# Patient Record
Sex: MALE | Race: WHITE | NOT HISPANIC OR LATINO | Employment: FULL TIME | URBAN - METROPOLITAN AREA
[De-identification: names, ages, dates, MRNs, and addresses within clinical notes are randomized per-mention and may not be internally consistent; named-entity substitution may affect disease eponyms.]

---

## 2017-04-02 ENCOUNTER — OFFICE VISIT (OUTPATIENT)
Dept: URGENT CARE | Facility: CLINIC | Age: 47
End: 2017-04-02
Payer: COMMERCIAL

## 2017-04-02 VITALS
HEART RATE: 110 BPM | TEMPERATURE: 97.3 F | OXYGEN SATURATION: 96 % | HEIGHT: 71 IN | BODY MASS INDEX: 43.4 KG/M2 | WEIGHT: 310 LBS | RESPIRATION RATE: 18 BRPM | SYSTOLIC BLOOD PRESSURE: 120 MMHG | DIASTOLIC BLOOD PRESSURE: 80 MMHG

## 2017-04-02 DIAGNOSIS — J40 BRONCHITIS: ICD-10-CM

## 2017-04-02 DIAGNOSIS — J32.9 OTHER SINUSITIS: ICD-10-CM

## 2017-04-02 PROCEDURE — 99203 OFFICE O/P NEW LOW 30 MIN: CPT | Performed by: PHYSICIAN ASSISTANT

## 2017-04-02 RX ORDER — PREDNISONE 20 MG/1
40 TABLET ORAL DAILY
Qty: 8 TAB | Refills: 0 | Status: SHIPPED | OUTPATIENT
Start: 2017-04-02 | End: 2017-04-06

## 2017-04-02 RX ORDER — PROMETHAZINE HYDROCHLORIDE AND CODEINE PHOSPHATE 6.25; 1 MG/5ML; MG/5ML
5-10 SYRUP ORAL 3 TIMES DAILY PRN
Qty: 150 ML | Refills: 0 | Status: SHIPPED | OUTPATIENT
Start: 2017-04-02 | End: 2018-02-21

## 2017-04-02 RX ORDER — CEFUROXIME AXETIL 500 MG/1
500 TABLET ORAL 2 TIMES DAILY
Qty: 20 TAB | Refills: 0 | Status: SHIPPED | OUTPATIENT
Start: 2017-04-02 | End: 2017-04-12

## 2017-04-02 RX ORDER — ALBUTEROL SULFATE 90 UG/1
2 AEROSOL, METERED RESPIRATORY (INHALATION) EVERY 4 HOURS PRN
Qty: 1 INHALER | Refills: 0 | Status: SHIPPED | OUTPATIENT
Start: 2017-04-02 | End: 2018-02-21

## 2017-04-02 ASSESSMENT — ENCOUNTER SYMPTOMS
HEADACHES: 1
SINUS PRESSURE: 1
SPUTUM PRODUCTION: 1
CONSTITUTIONAL NEGATIVE: 1
COUGH: 1
SHORTNESS OF BREATH: 0
SORE THROAT: 0
RHINORRHEA: 1
CARDIOVASCULAR NEGATIVE: 1
EYES NEGATIVE: 1

## 2017-04-02 NOTE — Clinical Note
April 2, 2017         Patient: Sal Abraham   YOB: 1970   Date of Visit: 4/2/2017           To Whom it May Concern:    Sal Abraham was seen in my clinic on 4/2/2017 for illness; please excuse Monday.    If you have any questions or concerns, please don't hesitate to call.        Sincerely,           Aldo Barksdale PA-C  Electronically Signed

## 2017-04-03 NOTE — PROGRESS NOTES
"Subjective:      Sal Abraham is a 46 y.o. male who presents with Nasal Congestion; Sinus Problem; and Cough            Sinus Problem  This is a new problem. The current episode started in the past 7 days. The problem is unchanged. There has been no fever. The pain is moderate. Associated symptoms include congestion, coughing, headaches and sinus pressure. Pertinent negatives include no shortness of breath or sore throat. Past treatments include nothing. The treatment provided no relief.   Cough  This is a new problem. The current episode started in the past 7 days. The problem has been unchanged. The problem occurs every few minutes. The cough is productive of sputum. Associated symptoms include headaches, nasal congestion and rhinorrhea. Pertinent negatives include no sore throat or shortness of breath. Nothing aggravates the symptoms. He has tried nothing for the symptoms. The treatment provided no relief. There is no history of asthma or environmental allergies.       Review of Systems   Constitutional: Negative.    HENT: Positive for congestion, rhinorrhea and sinus pressure. Negative for sore throat.    Eyes: Negative.    Respiratory: Positive for cough and sputum production. Negative for shortness of breath.    Cardiovascular: Negative.    Skin: Negative.    Neurological: Positive for headaches.   Endo/Heme/Allergies: Negative for environmental allergies.          Objective:     /80 mmHg  Pulse 110  Temp(Src) 36.3 °C (97.3 °F)  Resp 18  Ht 1.803 m (5' 11\")  Wt 140.615 kg (310 lb)  BMI 43.26 kg/m2  SpO2 96%     Physical Exam   Constitutional: He is oriented to person, place, and time. He appears well-developed and well-nourished. No distress.   HENT:   Head: Normocephalic and atraumatic.   +maxill.sinus press.     Eyes: EOM are normal. Pupils are equal, round, and reactive to light.   Neck: Normal range of motion. Neck supple.   Cardiovascular: Normal rate.    Pulmonary/Chest: Effort " "normal and breath sounds normal. No respiratory distress. He has no wheezes. He has no rales.   Lymphadenopathy:     He has no cervical adenopathy.   Neurological: He is alert and oriented to person, place, and time.   Skin: Skin is warm and dry.   Psychiatric: He has a normal mood and affect. His behavior is normal. Judgment and thought content normal.   Nursing note and vitals reviewed.    Filed Vitals:    04/02/17 1702   BP: 120/80   Pulse: 110   Temp: 36.3 °C (97.3 °F)   Resp: 18   Height: 1.803 m (5' 11\")   Weight: 140.615 kg (310 lb)   SpO2: 96%     Active Ambulatory Problems     Diagnosis Date Noted   • No Active Ambulatory Problems     Resolved Ambulatory Problems     Diagnosis Date Noted   • No Resolved Ambulatory Problems     No Additional Past Medical History     No current outpatient prescriptions on file prior to visit.     No current facility-administered medications on file prior to visit.     Gargles, Cepacol lozenges, Aleve/Advil as needed for throat pain  History reviewed. No pertinent family history.  Review of patient's allergies indicates no known allergies.              Assessment/Plan:     ·  sinusitis, bronchitis      ·       "

## 2018-02-15 ENCOUNTER — APPOINTMENT (OUTPATIENT)
Dept: MEDICAL GROUP | Facility: PHYSICIAN GROUP | Age: 48
End: 2018-02-15
Payer: COMMERCIAL

## 2018-02-21 ENCOUNTER — HOSPITAL ENCOUNTER (OUTPATIENT)
Dept: LAB | Facility: MEDICAL CENTER | Age: 48
End: 2018-02-21
Attending: FAMILY MEDICINE
Payer: COMMERCIAL

## 2018-02-21 ENCOUNTER — OFFICE VISIT (OUTPATIENT)
Dept: MEDICAL GROUP | Facility: PHYSICIAN GROUP | Age: 48
End: 2018-02-21
Payer: COMMERCIAL

## 2018-02-21 VITALS
TEMPERATURE: 97.6 F | HEIGHT: 71 IN | BODY MASS INDEX: 44.1 KG/M2 | HEART RATE: 106 BPM | WEIGHT: 315 LBS | SYSTOLIC BLOOD PRESSURE: 134 MMHG | DIASTOLIC BLOOD PRESSURE: 86 MMHG | OXYGEN SATURATION: 94 %

## 2018-02-21 DIAGNOSIS — I10 ESSENTIAL HYPERTENSION: ICD-10-CM

## 2018-02-21 DIAGNOSIS — Z00.00 ROUTINE HEALTH MAINTENANCE: ICD-10-CM

## 2018-02-21 DIAGNOSIS — G44.219 EPISODIC TENSION-TYPE HEADACHE, NOT INTRACTABLE: ICD-10-CM

## 2018-02-21 DIAGNOSIS — Z12.5 SCREENING FOR PROSTATE CANCER: ICD-10-CM

## 2018-02-21 PROBLEM — R51.9 FREQUENT HEADACHES: Status: ACTIVE | Noted: 2018-02-21

## 2018-02-21 PROBLEM — R09.89 CHRONIC SINUS COMPLAINTS: Status: ACTIVE | Noted: 2018-02-21

## 2018-02-21 LAB
ALBUMIN SERPL BCP-MCNC: 4.4 G/DL (ref 3.2–4.9)
ALBUMIN/GLOB SERPL: 1.2 G/DL
ALP SERPL-CCNC: 59 U/L (ref 30–99)
ALT SERPL-CCNC: 24 U/L (ref 2–50)
ANION GAP SERPL CALC-SCNC: 5 MMOL/L (ref 0–11.9)
AST SERPL-CCNC: 18 U/L (ref 12–45)
BASOPHILS # BLD AUTO: 0.6 % (ref 0–1.8)
BASOPHILS # BLD: 0.06 K/UL (ref 0–0.12)
BILIRUB SERPL-MCNC: 0.2 MG/DL (ref 0.1–1.5)
BUN SERPL-MCNC: 16 MG/DL (ref 8–22)
CALCIUM SERPL-MCNC: 9.8 MG/DL (ref 8.5–10.5)
CHLORIDE SERPL-SCNC: 103 MMOL/L (ref 96–112)
CHOLEST SERPL-MCNC: 225 MG/DL (ref 100–199)
CO2 SERPL-SCNC: 29 MMOL/L (ref 20–33)
CREAT SERPL-MCNC: 0.99 MG/DL (ref 0.5–1.4)
EOSINOPHIL # BLD AUTO: 0.11 K/UL (ref 0–0.51)
EOSINOPHIL NFR BLD: 1.2 % (ref 0–6.9)
ERYTHROCYTE [DISTWIDTH] IN BLOOD BY AUTOMATED COUNT: 39.5 FL (ref 35.9–50)
GLOBULIN SER CALC-MCNC: 3.7 G/DL (ref 1.9–3.5)
GLUCOSE SERPL-MCNC: 102 MG/DL (ref 65–99)
HCT VFR BLD AUTO: 51.3 % (ref 42–52)
HDLC SERPL-MCNC: 36 MG/DL
HGB BLD-MCNC: 17.4 G/DL (ref 14–18)
IMM GRANULOCYTES # BLD AUTO: 0.05 K/UL (ref 0–0.11)
IMM GRANULOCYTES NFR BLD AUTO: 0.5 % (ref 0–0.9)
LDLC SERPL CALC-MCNC: 146 MG/DL
LYMPHOCYTES # BLD AUTO: 1.67 K/UL (ref 1–4.8)
LYMPHOCYTES NFR BLD: 18 % (ref 22–41)
MCH RBC QN AUTO: 30 PG (ref 27–33)
MCHC RBC AUTO-ENTMCNC: 33.9 G/DL (ref 33.7–35.3)
MCV RBC AUTO: 88.4 FL (ref 81.4–97.8)
MONOCYTES # BLD AUTO: 1.16 K/UL (ref 0–0.85)
MONOCYTES NFR BLD AUTO: 12.5 % (ref 0–13.4)
NEUTROPHILS # BLD AUTO: 6.22 K/UL (ref 1.82–7.42)
NEUTROPHILS NFR BLD: 67.2 % (ref 44–72)
NRBC # BLD AUTO: 0 K/UL
NRBC BLD-RTO: 0 /100 WBC
PLATELET # BLD AUTO: 311 K/UL (ref 164–446)
PMV BLD AUTO: 10.9 FL (ref 9–12.9)
POTASSIUM SERPL-SCNC: 4.3 MMOL/L (ref 3.6–5.5)
PROT SERPL-MCNC: 8.1 G/DL (ref 6–8.2)
PSA SERPL-MCNC: 0.53 NG/ML (ref 0–4)
RBC # BLD AUTO: 5.8 M/UL (ref 4.7–6.1)
SODIUM SERPL-SCNC: 137 MMOL/L (ref 135–145)
TRIGL SERPL-MCNC: 214 MG/DL (ref 0–149)
TSH SERPL DL<=0.005 MIU/L-ACNC: 1.96 UIU/ML (ref 0.38–5.33)
WBC # BLD AUTO: 9.3 K/UL (ref 4.8–10.8)

## 2018-02-21 PROCEDURE — 99214 OFFICE O/P EST MOD 30 MIN: CPT | Performed by: NURSE PRACTITIONER

## 2018-02-21 PROCEDURE — 85025 COMPLETE CBC W/AUTO DIFF WBC: CPT

## 2018-02-21 PROCEDURE — 36415 COLL VENOUS BLD VENIPUNCTURE: CPT

## 2018-02-21 PROCEDURE — 84153 ASSAY OF PSA TOTAL: CPT

## 2018-02-21 PROCEDURE — 84443 ASSAY THYROID STIM HORMONE: CPT

## 2018-02-21 PROCEDURE — 80053 COMPREHEN METABOLIC PANEL: CPT

## 2018-02-21 PROCEDURE — 80061 LIPID PANEL: CPT

## 2018-02-21 NOTE — ASSESSMENT & PLAN NOTE
New problem over the past few months. Pain starts in occipital region wrapping up head and across forehead. They seem to occur after being at work for a few hours. 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 Stress levels have been increased.  Denies any dizziness, numbness, tingling, or vision changes. +photophobia and phonophobia only once. No associated N/V.

## 2018-02-21 NOTE — ASSESSMENT & PLAN NOTE
New problem. 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 He has not been monitoring blood pressure on his own because he has trouble finding cuff that fits.   Father had hx of heart disease, HTN, HLD, and heart failure   Caffeine: soda 24 oz on average a day  NSAIDs: occasional use  Smoking: no  Exercise: none  Nutrition: very poor   Decongestants: daily

## 2018-02-21 NOTE — ASSESSMENT & PLAN NOTE
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 He has gained about 15 lb in the past year.   24 hour diet recall: [employer] cheese steak sandwich, candy, fries. Fluids include soda, infrequently drinks water

## 2018-02-22 NOTE — PROGRESS NOTES
Subjective:     Chief Complaint   Patient presents with   • Follow-Up     High BP Concerns    • Headache     Frequent headaches        HPI  Sal Kristian Abraham is a 47 y.o. male here today for HTN. He will be establishing with Dr. Brown in April.     Hypertension  New problem. 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 He has not been monitoring blood pressure on his own because he has trouble finding cuff that fits.   Father had hx of heart disease, HTN, HLD, and heart failure   Caffeine: soda 24 oz on average a day  NSAIDs: occasional use  Smoking: no  Exercise: none  Nutrition: very poor   Decongestants: daily      Frequent headaches  New problem over the past few months. Pain starts in occipital region wrapping up head and across forehead. They seem to occur after being at work for a few hours. 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 Stress levels have been increased.  Denies any dizziness, numbness, tingling, or vision changes. +photophobia and phonophobia only once. No associated N/V.     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 He has gained about 15 lb in the past year.   24 hour diet recall: [employer] cheese steak sandwich, candy, fries. Fluids include soda, infrequently drinks water       Diagnoses of Essential hypertension, Frequent headaches, BMI 45.0-49.9, adult (CMS-HCC), Screening for prostate cancer, and Routine health maintenance were pertinent to this visit.    Allergies: Patient has no known allergies.  Current medicines (including changes today)  No current outpatient prescriptions on file.     No current facility-administered medications for this visit.        He  has a past medical history of Hypertension.      ROS  As stated in HPI and additionally  Neuro: +new onset headaches. No dizziness  CV: No chest pain, MO, LE edema  Pulm: No sob or dyspnea     Objective:  "    Blood pressure 134/86, pulse (!) 106, temperature 36.4 °C (97.6 °F), height 1.803 m (5' 11\"), weight (!) 147.9 kg (326 lb), SpO2 94 %. Body mass index is 45.47 kg/m².  Physical Exam:  General: Alert, oriented, in no acute distress.  Eye contact is good, speech goal directed, affect calm  CNs grossly intact.  HEENT: conjunctiva non-injected, sclera non-icteric, EOMs intact. No lid edema or eye drainage.   Gross hearing intact.  Lungs: unlabored. clear to auscultation bilaterally with good excursion.  CV: regular rate and rhythm. No murmurs. No carotid bruits.   Ext: no edema, normal color and temperature.   Skin: No rashes or lesions in visible areas  Gait steady.     Assessment and Plan:   Assessment/Plan:  1. Essential hypertension  Not controlled, although borderline today. Enc stopping decongestant, decrease soda intake, change diet (educated on healthy choices), and start exercising. Check labs now. Goal weight loss 5-8 lb in the next 8 weeks. If bp not controlled at April appointment, consider starting medication for treatment.   - COMP METABOLIC PANEL; Future  - LIPID PROFILE; Future  - TSH WITH REFLEX TO FT4; Future    2. Episodic tension type headache, not intractable  Educated on diagnosis. Enc stress management, exercise, heat to neck and massage. Also encouraged breaks from working on computer every 2 hours     3. BMI 45.0-49.9, adult (CMS-[facility])  Goal 5-8 lb weight loss in 8 weeks   - Patient identified as having weight management issue.  Appropriate orders and counseling given.    4. Screening for prostate cancer  - PROSTATE SPECIFIC AG SCREENING; Future    5. Routine health maintenance  - CBC WITH DIFFERENTIAL; Future       Follow up:  Return keep appt with Dr. Brown in April .    Educated in proper administration of medication(s) ordered today including safety, possible SE, risks, benefits, rationale and alternatives to therapy.   Supportive care, differential diagnoses, and indications for " immediate follow-up discussed with patient.    Pathogenesis of diagnosis discussed including typical length and natural progression.    Instructed to return to clinic or nearest emergency department for any change in condition, further concerns, or worsening of symptoms.  Patient states understanding of the plan of care and discharge instructions.      Please note that this dictation was created using voice recognition software. I have made every reasonable attempt to correct obvious errors, but I expect that there are errors of grammar and possibly content that I did not discover before finalizing the note.    Followup: Return keep appt with Dr. Brown in April . sooner should new symptoms or problems arise.

## 2022-06-01 ENCOUNTER — APPOINTMENT (OUTPATIENT)
Dept: RADIOLOGY | Facility: IMAGING CENTER | Age: 52
End: 2022-06-01
Attending: PHYSICIAN ASSISTANT
Payer: COMMERCIAL

## 2022-06-01 ENCOUNTER — OFFICE VISIT (OUTPATIENT)
Dept: URGENT CARE | Facility: PHYSICIAN GROUP | Age: 52
End: 2022-06-01
Payer: COMMERCIAL

## 2022-06-01 VITALS
HEIGHT: 74 IN | DIASTOLIC BLOOD PRESSURE: 82 MMHG | BODY MASS INDEX: 40.43 KG/M2 | TEMPERATURE: 97.9 F | SYSTOLIC BLOOD PRESSURE: 138 MMHG | HEART RATE: 86 BPM | RESPIRATION RATE: 14 BRPM | OXYGEN SATURATION: 98 % | WEIGHT: 315 LBS

## 2022-06-01 DIAGNOSIS — R07.81 RIB PAIN ON RIGHT SIDE: ICD-10-CM

## 2022-06-01 PROCEDURE — 99203 OFFICE O/P NEW LOW 30 MIN: CPT | Performed by: PHYSICIAN ASSISTANT

## 2022-06-01 PROCEDURE — 71101 X-RAY EXAM UNILAT RIBS/CHEST: CPT | Mod: TC,FY,RT | Performed by: RADIOLOGY

## 2022-06-01 RX ORDER — ESCITALOPRAM OXALATE 10 MG/1
10 TABLET ORAL DAILY
COMMUNITY

## 2022-06-01 RX ORDER — CYCLOBENZAPRINE HCL 5 MG
5-10 TABLET ORAL 3 TIMES DAILY PRN
Qty: 30 TABLET | Refills: 0 | Status: SHIPPED | OUTPATIENT
Start: 2022-06-01

## 2022-06-01 RX ORDER — HYDROXYZINE HYDROCHLORIDE 25 MG/1
TABLET, FILM COATED ORAL
COMMUNITY
Start: 2022-05-16

## 2022-06-01 ASSESSMENT — PAIN SCALES - GENERAL: PAINLEVEL: 6=MODERATE PAIN

## 2022-06-01 NOTE — LETTER
June 1, 2022         Patient: Sal Abraham   YOB: 1970   Date of Visit: 6/1/2022           To Whom it May Concern:    Sal Abraham was seen in my clinic on 6/1/2022.  He is recovering from an injury to his right ribs.  Please allow him to do light duty for the next week if possible.    If you have any questions or concerns, please don't hesitate to call.        Sincerely,           Nory Fairbanks P.A.-C.  Electronically Signed

## 2022-06-09 ASSESSMENT — ENCOUNTER SYMPTOMS
VOMITING: 0
DIZZINESS: 0
COUGH: 0
CHILLS: 0
NAUSEA: 0
SHORTNESS OF BREATH: 0
FEVER: 0
HEMOPTYSIS: 0

## 2022-06-09 NOTE — PROGRESS NOTES
"Subjective     Sal Abraham is a 51 y.o. male who presents with Rib Injury (Wife was popping his back and also popped his rib pt states, moving to a new house aggravated it pt states, unable to sleep)    HPI:  Sal Abraham is a 51 y.o. male who presents today for evaluation of right rib pain.  Patient reports that approximately 4 days ago or so his wife is helping above his back.  She did this by stepping on his back.  States that his back did pop when she did this he noticed a \"pop\" and pain in his right anterior rib cage as well.  There has been no improvement in the last 4 days.  Pain is worse with deep breaths and with certain movements.  He is having a hard time sleeping.  Not being relieved by ice or OTC analgesics.  No fever/chills, shortness of breath, or hemoptysis.      Review of Systems   Constitutional: Negative for chills and fever.   Respiratory: Negative for cough, hemoptysis and shortness of breath.    Gastrointestinal: Negative for nausea and vomiting.   Musculoskeletal:        Right rib pain   Skin: Negative for rash.   Neurological: Negative for dizziness.         PMH:  has a past medical history of Hypertension.  MEDS:   Current Outpatient Medications:   •  hydrOXYzine HCl (ATARAX) 25 MG Tab, , Disp: , Rfl:   •  escitalopram (LEXAPRO) 10 MG Tab, Take 10 mg by mouth every day., Disp: , Rfl:   •  cyclobenzaprine (FLEXERIL) 5 mg tablet, Take 1-2 Tablets by mouth 3 times a day as needed for Moderate Pain or Muscle Spasms., Disp: 30 Tablet, Rfl: 0  ALLERGIES: No Known Allergies  SURGHX: No past surgical history on file.  SOCHX:  reports that he quit smoking about 25 years ago. His smoking use included cigarettes. He has a 27.00 pack-year smoking history. He quit smokeless tobacco use about 25 years ago.  His smokeless tobacco use included chew. He reports current alcohol use. He reports that he does not use drugs.  FH: Family history was reviewed, no pertinent findings to " "report      Objective     /82 (BP Location: Right arm, Patient Position: Sitting, BP Cuff Size: Adult)   Pulse 86   Temp 36.6 °C (97.9 °F) (Temporal)   Resp 14   Ht 1.88 m (6' 2\")   Wt (!) 148 kg (326 lb)   SpO2 98%   BMI 41.86 kg/m²      Physical Exam  Constitutional:       Appearance: He is well-developed.   HENT:      Head: Normocephalic and atraumatic.      Right Ear: External ear normal.      Left Ear: External ear normal.   Eyes:      Conjunctiva/sclera: Conjunctivae normal.      Pupils: Pupils are equal, round, and reactive to light.   Cardiovascular:      Rate and Rhythm: Normal rate and regular rhythm.      Heart sounds: Normal heart sounds. No murmur heard.  Pulmonary:      Effort: Pulmonary effort is normal.      Breath sounds: Normal breath sounds. No decreased breath sounds, wheezing, rhonchi or rales.   Chest:      Comments: Right anterior lower rib cage is diffusely tender to palpation.  Tenderness extends into the right upper quadrant of the abdomen.  Negative Dia sign.  No crepitus.  No overlying erythema or ecchymosis.  Patient has symmetrical rise and fall of the chest wall.  Musculoskeletal:      Cervical back: Normal range of motion.   Lymphadenopathy:      Cervical: No cervical adenopathy.   Skin:     General: Skin is warm and dry.      Capillary Refill: Capillary refill takes less than 2 seconds.   Neurological:      Mental Status: He is alert and oriented to person, place, and time.   Psychiatric:         Behavior: Behavior normal.         Judgment: Judgment normal.           [id number] (WITH 1-VIEW CXR) RIGHT  FINDINGS:  No displaced fractures or acute bony changes are noted.     There is no pneumothorax or hemothorax.        IMPRESSION:  1.  Unremarkable rib series.        *X-rays were reviewed and interpreted independently by me. I agree with the radiologist's findings     Assessment & Plan     1. Rib pain on right side  - [id number] (WITH 1-VIEW CXR) RIGHT; " Future  - cyclobenzaprine (FLEXERIL) 5 mg tablet; Take 1-2 Tablets by mouth 3 times a day as needed for Moderate Pain or Muscle Spasms.  Dispense: 30 Tablet; Refill: 0  Chest x-ray does not reveal any evidence of acute fracture or dislocation.  Symptoms seem most consistent with rib contusion or cartilage strain.  He was having some tenderness into his right upper quadrant as well, however.  Based on the timing of this discomfort, however, I believe it is more related to the rib injury.  Negative Dia sign on exam.  If his pain persists, however, he should return to the urgent care for more extensive evaluation, including possibly being worked up for gallbladder pathology.  Patient was prescribed muscle relaxers to use.  He was advised not to use muscle relaxers while driving, operating heavy machinery, or while drinking alcohol.  He may use OTC analgesics as needed for pain and should alternate ice/heat to the affected area as needed as well.  Also discussed doing deep breathing exercises multiple times per day.      Differential Diagnosis, natural history, and supportive care discussed. Return to the Urgent Care or follow up with your PCP if symptoms fail to resolve, or for any new or worsening symptoms. Emergency room precautions discussed. Patient and/or family appears understanding of information.